# Patient Record
Sex: MALE | Race: WHITE | NOT HISPANIC OR LATINO | URBAN - METROPOLITAN AREA
[De-identification: names, ages, dates, MRNs, and addresses within clinical notes are randomized per-mention and may not be internally consistent; named-entity substitution may affect disease eponyms.]

---

## 2019-06-14 ENCOUNTER — OFFICE VISIT CONVERTED (OUTPATIENT)
Dept: ORTHOPEDIC SURGERY | Facility: CLINIC | Age: 69
End: 2019-06-14
Attending: ORTHOPAEDIC SURGERY

## 2019-07-18 ENCOUNTER — CONVERSION ENCOUNTER (OUTPATIENT)
Dept: ORTHOPEDIC SURGERY | Facility: CLINIC | Age: 69
End: 2019-07-18

## 2019-07-18 ENCOUNTER — OFFICE VISIT CONVERTED (OUTPATIENT)
Dept: ORTHOPEDIC SURGERY | Facility: CLINIC | Age: 69
End: 2019-07-18
Attending: PHYSICIAN ASSISTANT

## 2019-08-29 ENCOUNTER — OFFICE VISIT CONVERTED (OUTPATIENT)
Dept: ORTHOPEDIC SURGERY | Facility: CLINIC | Age: 69
End: 2019-08-29
Attending: ORTHOPAEDIC SURGERY

## 2019-10-30 ENCOUNTER — OFFICE VISIT CONVERTED (OUTPATIENT)
Dept: NEUROSURGERY | Facility: CLINIC | Age: 69
End: 2019-10-30
Attending: PHYSICIAN ASSISTANT

## 2019-10-30 ENCOUNTER — CONVERSION ENCOUNTER (OUTPATIENT)
Dept: NEUROLOGY | Facility: CLINIC | Age: 69
End: 2019-10-30

## 2019-11-07 ENCOUNTER — HOSPITAL ENCOUNTER (OUTPATIENT)
Dept: PHYSICAL THERAPY | Facility: CLINIC | Age: 69
Setting detail: RECURRING SERIES
Discharge: HOME | End: 2020-01-14
Attending: NEUROLOGICAL SURGERY

## 2019-12-19 ENCOUNTER — OFFICE VISIT CONVERTED (OUTPATIENT)
Dept: ORTHOPEDIC SURGERY | Facility: CLINIC | Age: 69
End: 2019-12-19
Attending: ORTHOPAEDIC SURGERY

## 2020-01-31 ENCOUNTER — OFFICE VISIT CONVERTED (OUTPATIENT)
Dept: ORTHOPEDIC SURGERY | Facility: CLINIC | Age: 70
End: 2020-01-31
Attending: PHYSICIAN ASSISTANT

## 2020-03-10 ENCOUNTER — OFFICE VISIT CONVERTED (OUTPATIENT)
Dept: NEUROSURGERY | Facility: CLINIC | Age: 70
End: 2020-03-10
Attending: PHYSICIAN ASSISTANT

## 2020-03-16 ENCOUNTER — OFFICE VISIT CONVERTED (OUTPATIENT)
Dept: NEUROSURGERY | Facility: CLINIC | Age: 70
End: 2020-03-16
Attending: NEUROLOGICAL SURGERY

## 2020-05-06 ENCOUNTER — TELEPHONE CONVERTED (OUTPATIENT)
Dept: NEUROSURGERY | Facility: CLINIC | Age: 70
End: 2020-05-06
Attending: NEUROLOGICAL SURGERY

## 2020-06-01 ENCOUNTER — OFFICE VISIT CONVERTED (OUTPATIENT)
Dept: NEUROSURGERY | Facility: CLINIC | Age: 70
End: 2020-06-01
Attending: NEUROLOGICAL SURGERY

## 2021-05-13 NOTE — PROGRESS NOTES
Quick Note      Patient Name: Amadou Galloway   Patient ID: [id number]   Sex: Male   YOB: 1950    Referring Provider: Priscila Pierson MD    Visit Date: May 6, 2020    Provider: Kermit Lee MD   Location: [facility]   Location Address: [address] [place] [address]  [id number]   Location Phone: [phone]          History Of Present Illness  TELEHEALTH TELEPHONE VISIT  Chief Complaint: Discuss MRI results   Amadou Galloway is a 70 year old /White male who is presenting for evaluation via telehealth telephone visit. Verbal consent obtained before beginning visit.   Provider spent 7 minutes with the patient during telehealth visit.   The following staff were present during this visit: md patricia   Past Medical History/Overview of Patient Symptoms     Verbal consent was reconfirmed at beginning of the conversation    Back continues to have low back pain with ration left hip and down the leg.  Radiates in a lateral distribution into the top the foot and toes    Reports no change in this foot drop    MRI reviewed appears relatively stable compared to his last one may be slightly increased foraminal narrowing on the left L5-S1.               Assessment  · Foot drop, left     736.79/M21.372  · Spinal stenosis     724.00/M48.00    Problems Reconciled  Plan  · Orders  o Physican Telephone evaluation, 5-10 min (87266) - - 05/06/2020  · Medications  o Medications have been Reconciled  o Transition of Care or Provider Policy  · Instructions  o Plan Of Care:   o Chronic conditions reviewed and taken into consideration for today's treatment plan.  o Patient instructed to seek medical attention urgently for new or worsening symptoms.  o Patient was educated/instructed on their diagnosis, treatment and medications prior to discharge from the clinic today.  o Discussed Covid-19 precautions including, but not limited to, social distancing, avoid touching your face, and hand washing.    · Disposition  o Call or Return if symptoms worsen or persist.  o follow up next week     This was a  [telephone] visit from [home] .  I personally spent 7 minutes of phone time.  This does not include the time connecting to the patient , our medical assistant spent updating demographics, preferred pharmacy, medication changes, and the note, order(s), maintain those to the office staff or other ancillary task(s).    Please note portions of this document are an electronic transcription of spoken language to printed text. The electronic translation/transcription may permit erroneous or at times nonsensical words or phrases to be inadvertently transcribed.  I have reviewed the note for such errors; however, some may still exist. Before it was signed, the document was checked for such errors, but some may still exist.  The reader is encouraged to use prudent judgment when interpreting this document.             Electronically Signed by: Kermit Lee MD -Author on May 6, 2020 11:13:53 AM

## 2021-05-13 NOTE — PROGRESS NOTES
Progress Note      Patient Name: Amadou Galloway   Patient ID: [id number]   Sex: Male   YOB: 1950    Referring Provider: Priscila Pierson MD    Visit Date: June 1, 2020    Provider: Kermit Lee MD   Location: [facility]   Location Address: [address] [place] [address]  [id number]   Location Phone: [phone]          Chief Complaint     Here with complaints of low back and left leg pain.       History Of Present Illness     continues with LBP with radiation into LLE    radiates down lateral thigh wrapping onto anterior shin into top of foot and toes.    denies sx on right    PT:  without benefit  Had Hip injection and knee injection but only helped hip pain           Past Medical History  Arthritis; Cancer; Chest pain; Congestive heart failure; Diabetes; Diabetes mellitus, insulin dependent (IDDM), controlled; Foot drop, left; Heart Attack; Heart Disease; Hypertension; Hypertension, Benign Essential; Lumbago/low back pain; Lumbago/low back pain; Lumbosacral disc herniation, L5-S1; Muscle cramps; Prostate Disorder; Reflux; Sciatica; Shortness of Breath; Spinal stenosis, lumbar region         Past Surgical History  Carpal Tunnel Release; Cholecystecomy; Hand surgery; heart surgery         Medication List  atorvastatin 40 mg oral tablet; hydrocodone-acetaminophen  mg oral tablet; Janumet XR 50-1,000 mg oral tablet, ER multiphase 24 hr; Januvia 100 mg oral tablet; lisinopril 30 mg oral tablet; lisinopril 40 mg oral tablet; metformin 1,000 mg oral tablet; metoprolol tartrate 25 mg oral tablet; nitroglycerin 0.4 mg sublingual tablet, sublingual; Novolin 70/30 U-100 Insulin 100 unit/mL (70-30) subcutaneous suspension; omeprazole 20 mg oral capsule,delayed release(DR/EC); trazodone 50 mg oral tablet; Vaqta (PF) 50 unit/mL intramuscular suspension; zolpidem 10 mg oral tablet         Allergy List  NO KNOWN DRUG ALLERGIES         Family Medical History  Heart Disease; Diabetes,  unspecified type; Family history of Arthritis; Family history of cancer; Family history of diabetes mellitus         Social History  Alcohol (Never); Alcohol Use (Never); Claustophobic (Unknown); lives with spouse; ; .; Recreational Drug Use (Never); Tobacco (Never); Working         Review of Systems  · Constitutional  o Denies  o : chills, excessive sweating, fatigue, fever, sycope/passing out, weight gain, weight loss  · Eyes  o Denies  o : changes in vision, blurry vision, double vision  · HENT  o Denies  o : loss of hearing, ringing in the ears, ear aches, sore throat, nasal congestion, sinus pain, nose bleeds, seasonal allergies  · Cardiovascular  o Admits  o : swollen legs  o Denies  o : blood clots, anemia, easy burising or bleeding, transfusions  · Respiratory  o Denies  o : shortness of breath, dry cough, productive cough, pneumonia, COPD  · Gastrointestinal  o Denies  o : difficulty swallowing, reflux  · Genitourinary  o Denies  o : incontinence  · Neurologic  o Admits  o : weakness  o Denies  o : headache, seizure, stroke, tremor, loss of balance, falls, dizziness/vertigo, difficulty with sleep, numbness/tingling/paresthesia , difficulty with coordination, difficulty with dexterity  · Musculoskeletal  o Admits  o : pain radiating in leg, low back pain  o Denies  o : neck stiffness/pain, swollen lymph nodes, muscle aches, joint pain, weakness, spasms, sciatica, pain radiating in arm  · Endocrine  o Admits  o : diabetes  o Denies  o : thyroid disorder  · Psychiatric  o Denies  o : anxiety, depression      Vitals  Date Time BP Position Site L\R Cuff Size HR RR TEMP (F) WT  HT  BMI kg/m2 BSA m2 O2 Sat        06/01/2020 01:28 PM        98.3 270lbs 7oz 6'   36.68 2.5           Physical Examination     aaox3  NAD  3/5 DF on left  1/4 knee and ankle               Assessment  · Pre-op examination     V72.84/Z01.818  · Lumbago/low back pain     724.3/M54.40  chronic and progressive  · Lumbar Spinal  Stenosis     724.02/M48.06  lateral recess L4/5 on left secondary to facet arthropathy and hypertrophied ligament    also narrowing of left L5/s1 foramin secondary to facet arthropathy   · Spinal stenosis, lumbar region     724.02/M48.06  lateral recess along with foraminal stenosis left L5/s1  · Foot drop, left     736.79/M21.372  will repeat MRI given mild DDD at L3/4/ and L4/5 to assess for any changes that could explain the foot drop.       Plan  · Orders  o [facility] Pre-Op Covid-19 Screening (26714) - V72.84/Z01.818 - 06/01/2020  · Medications  o Medications have been Reconciled  o Transition of Care or Provider Policy  · Instructions  o ****Surgical Orders****  o Outpatient  o RISK AND BENEFITS:  o Possible risks/complications, benefits and alternatives to surgical or invasive procedure have been explained to the patient and/or legal guardian.  o ***************  o PREP: Per protocol;  o IV: Per Anesthesia;  o SCD's preoperatively  o *******************************************  o PRE- OP MEDICATION ORDER:  o *******************************************  o Kefzol 2 grams IV on call to OR.  o Date of Surgical Procedure:   o Please sign permit for:  o omayra-Laminectomy on left   o lumbar four to lumbar five  o The above History and Physical must have been completed within 30 days of admission.  · Disposition  o Call or Return if symptoms worsen or persist.            Electronically Signed by: Kermit Lee MD -Author on June 1, 2020 02:06:30 PM

## 2021-05-15 VITALS — BODY MASS INDEX: 37.25 KG/M2 | OXYGEN SATURATION: 96 % | HEART RATE: 76 BPM | WEIGHT: 275 LBS | HEIGHT: 72 IN

## 2021-05-15 VITALS — WEIGHT: 270.44 LBS | BODY MASS INDEX: 36.63 KG/M2 | TEMPERATURE: 98.3 F | HEIGHT: 72 IN

## 2021-05-15 VITALS
DIASTOLIC BLOOD PRESSURE: 87 MMHG | WEIGHT: 268.31 LBS | BODY MASS INDEX: 36.34 KG/M2 | SYSTOLIC BLOOD PRESSURE: 147 MMHG | HEIGHT: 72 IN

## 2021-05-15 VITALS — HEART RATE: 56 BPM | WEIGHT: 275 LBS | OXYGEN SATURATION: 95 % | BODY MASS INDEX: 37.25 KG/M2 | HEIGHT: 72 IN

## 2021-05-15 VITALS — OXYGEN SATURATION: 94 % | HEART RATE: 57 BPM | HEIGHT: 72 IN | WEIGHT: 270 LBS | BODY MASS INDEX: 36.57 KG/M2

## 2021-05-15 VITALS — BODY MASS INDEX: 36.98 KG/M2 | HEART RATE: 70 BPM | HEIGHT: 72 IN | OXYGEN SATURATION: 95 % | WEIGHT: 273 LBS

## 2021-05-15 VITALS — HEIGHT: 72 IN | WEIGHT: 275 LBS | OXYGEN SATURATION: 98 % | BODY MASS INDEX: 37.25 KG/M2 | HEART RATE: 63 BPM

## 2021-05-15 VITALS — WEIGHT: 195 LBS | BODY MASS INDEX: 26.41 KG/M2 | HEART RATE: 77 BPM | HEIGHT: 72 IN

## 2021-05-15 VITALS — HEIGHT: 72 IN | HEART RATE: 73 BPM | BODY MASS INDEX: 36.98 KG/M2 | WEIGHT: 273 LBS
